# Patient Record
Sex: FEMALE | Race: WHITE | Employment: PART TIME | URBAN - METROPOLITAN AREA
[De-identification: names, ages, dates, MRNs, and addresses within clinical notes are randomized per-mention and may not be internally consistent; named-entity substitution may affect disease eponyms.]

---

## 2018-10-04 ENCOUNTER — TELEPHONE (OUTPATIENT)
Dept: FAMILY MEDICINE CLINIC | Facility: CLINIC | Age: 43
End: 2018-10-04

## 2018-10-04 NOTE — TELEPHONE ENCOUNTER
Called patient, she states she is going to school soon and needs immunizations, but is unsure if needs boosters. Patient states she does not have immunization record either, due to immigration.    I recommended patient to schedule apt with Dr. Farrah Hall or

## 2018-10-05 ENCOUNTER — APPOINTMENT (OUTPATIENT)
Dept: LAB | Facility: REFERENCE LAB | Age: 43
End: 2018-10-05
Attending: FAMILY MEDICINE
Payer: COMMERCIAL

## 2018-10-05 ENCOUNTER — OFFICE VISIT (OUTPATIENT)
Dept: FAMILY MEDICINE CLINIC | Facility: CLINIC | Age: 43
End: 2018-10-05
Payer: COMMERCIAL

## 2018-10-05 VITALS
HEART RATE: 96 BPM | RESPIRATION RATE: 17 BRPM | HEIGHT: 64 IN | OXYGEN SATURATION: 98 % | WEIGHT: 178 LBS | SYSTOLIC BLOOD PRESSURE: 110 MMHG | BODY MASS INDEX: 30.39 KG/M2 | TEMPERATURE: 98 F | DIASTOLIC BLOOD PRESSURE: 70 MMHG

## 2018-10-05 DIAGNOSIS — Z12.31 BREAST CANCER SCREENING BY MAMMOGRAM: ICD-10-CM

## 2018-10-05 DIAGNOSIS — Z23 ENCOUNTER FOR IMMUNIZATION: ICD-10-CM

## 2018-10-05 DIAGNOSIS — Z00.00 ROUTINE GENERAL MEDICAL EXAMINATION AT A HEALTH CARE FACILITY: Primary | ICD-10-CM

## 2018-10-05 DIAGNOSIS — Z02.89 ENCOUNTER FOR OCCUPATIONAL HISTORY AND PHYSICAL EXAMINATION: ICD-10-CM

## 2018-10-05 PROCEDURE — 90715 TDAP VACCINE 7 YRS/> IM: CPT | Performed by: FAMILY MEDICINE

## 2018-10-05 PROCEDURE — 90686 IIV4 VACC NO PRSV 0.5 ML IM: CPT | Performed by: FAMILY MEDICINE

## 2018-10-05 PROCEDURE — 86706 HEP B SURFACE ANTIBODY: CPT | Performed by: FAMILY MEDICINE

## 2018-10-05 PROCEDURE — 86658 ENTEROVIRUS ANTIBODY: CPT | Performed by: FAMILY MEDICINE

## 2018-10-05 PROCEDURE — 36415 COLL VENOUS BLD VENIPUNCTURE: CPT | Performed by: FAMILY MEDICINE

## 2018-10-05 PROCEDURE — 90632 HEPA VACCINE ADULT IM: CPT | Performed by: FAMILY MEDICINE

## 2018-10-05 PROCEDURE — 86762 RUBELLA ANTIBODY: CPT | Performed by: FAMILY MEDICINE

## 2018-10-05 PROCEDURE — 86787 VARICELLA-ZOSTER ANTIBODY: CPT | Performed by: FAMILY MEDICINE

## 2018-10-05 PROCEDURE — 86735 MUMPS ANTIBODY: CPT | Performed by: FAMILY MEDICINE

## 2018-10-05 PROCEDURE — 86480 TB TEST CELL IMMUN MEASURE: CPT | Performed by: FAMILY MEDICINE

## 2018-10-05 PROCEDURE — 86765 RUBEOLA ANTIBODY: CPT | Performed by: FAMILY MEDICINE

## 2018-10-05 PROCEDURE — 90472 IMMUNIZATION ADMIN EACH ADD: CPT | Performed by: FAMILY MEDICINE

## 2018-10-05 PROCEDURE — 99203 OFFICE O/P NEW LOW 30 MIN: CPT | Performed by: FAMILY MEDICINE

## 2018-10-05 PROCEDURE — 90471 IMMUNIZATION ADMIN: CPT | Performed by: FAMILY MEDICINE

## 2018-10-05 NOTE — PATIENT INSTRUCTIONS
PLAN:  -bloodwork-titers    -mammogram ordered    -Hep A, TdaP, Influenza vaccinations    -f/u for pap smear at earliest convenience-fasting bloodwork at a later date

## 2018-10-05 NOTE — PROGRESS NOTES
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 VSS. Denies fevers/chills, C.P., palpitations, dizziness, SOB, cough, abd pain, N/V/D, fatigue. No recent illness. Nml urine without dysuria or hematuria. hepatosplenomegaly. No flank pain. No rebound/rigidity. Musc: Normal muscular development. Normal gait. Extremities: Extremities normal, atraumatic, no cyanosis or edema. DP's 2+ B/L  Neuro: Normal strength, sensation and reflexes throughout.   Skin: Skin

## 2018-10-18 ENCOUNTER — NURSE ONLY (OUTPATIENT)
Dept: FAMILY MEDICINE CLINIC | Facility: CLINIC | Age: 43
End: 2018-10-18
Payer: COMMERCIAL

## 2018-10-18 NOTE — PROGRESS NOTES
Pt came in for hep B & MMR injection. Injection was administrated on right  arm IM and left arm sub. Pt tolerated well.

## 2018-12-03 ENCOUNTER — LAB ENCOUNTER (OUTPATIENT)
Dept: LAB | Facility: REFERENCE LAB | Age: 43
End: 2018-12-03
Attending: FAMILY MEDICINE
Payer: COMMERCIAL

## 2018-12-03 ENCOUNTER — TELEPHONE (OUTPATIENT)
Dept: FAMILY MEDICINE CLINIC | Facility: CLINIC | Age: 43
End: 2018-12-03

## 2018-12-03 DIAGNOSIS — Z23 NEED FOR VACCINATION: ICD-10-CM

## 2018-12-03 DIAGNOSIS — Z00.00 ROUTINE GENERAL MEDICAL EXAMINATION AT A HEALTH CARE FACILITY: ICD-10-CM

## 2018-12-03 PROCEDURE — 36415 COLL VENOUS BLD VENIPUNCTURE: CPT | Performed by: FAMILY MEDICINE

## 2018-12-03 PROCEDURE — 80050 GENERAL HEALTH PANEL: CPT | Performed by: FAMILY MEDICINE

## 2018-12-03 PROCEDURE — 80061 LIPID PANEL: CPT | Performed by: FAMILY MEDICINE

## 2018-12-03 PROCEDURE — 86735 MUMPS ANTIBODY: CPT | Performed by: FAMILY MEDICINE

## 2018-12-03 PROCEDURE — 86706 HEP B SURFACE ANTIBODY: CPT | Performed by: FAMILY MEDICINE

## 2018-12-03 NOTE — TELEPHONE ENCOUNTER
[phone]    Pt is done with testing. 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

## 2018-12-05 ENCOUNTER — TELEPHONE (OUTPATIENT)
Dept: FAMILY MEDICINE CLINIC | Facility: CLINIC | Age: 43
End: 2018-12-05

## 2018-12-05 NOTE — TELEPHONE ENCOUNTER
Results are in pt's EMR; all results printed for pt. Form placed on provider's desk for completion and signature. Call pt when complete.

## 2018-12-05 NOTE — TELEPHONE ENCOUNTER
See result recs--    Additionally, lipids show elev LDL chol, low HDL chol-rec HHD/DASH diet, regular exercise: goal 5x/week of moderate-intensity exercise. Recheck lipids in 6 months.

## 2018-12-05 NOTE — TELEPHONE ENCOUNTER
----- Message from Johana Ashby DO sent at 12/5/2018  2:24 PM CST -----  Nml CMP/TSH. Immune to Mumps. CBC: microcytosis noted.  No anemia however rec f/u iron studies to ensure no vitamin deficiency (pending potential add-on to existing labs--)

## 2018-12-05 NOTE — TELEPHONE ENCOUNTER
Ok-fasting lipids are ideal-encourage concentration on HHD/DASH diet and recheck lipids earlier (fasting) in 3 months    FYI-lab unable to add on iron studies-rec she return for these labs

## 2018-12-05 NOTE — TELEPHONE ENCOUNTER
Spoke with pt, informed her of lab results and recommendations per Dr. Ransom Sacks. Pt informed additional labs have been added; will inform her of results when returns.  Pt informed she is not immune to Hep B; pt scheduled appt for Hep B #2 on 12/6/18 and #3

## 2018-12-05 NOTE — TELEPHONE ENCOUNTER
Informed pt of Lipid panel results and recommendations per Dr. Mehrdad Shrestha. Pt states she is currently exercising at this level; pt was not fasting during test (coffee with cream and sugar).

## 2018-12-06 ENCOUNTER — APPOINTMENT (OUTPATIENT)
Dept: LAB | Facility: REFERENCE LAB | Age: 43
End: 2018-12-06
Attending: FAMILY MEDICINE
Payer: COMMERCIAL

## 2018-12-06 ENCOUNTER — NURSE ONLY (OUTPATIENT)
Dept: FAMILY MEDICINE CLINIC | Facility: CLINIC | Age: 43
End: 2018-12-06
Payer: COMMERCIAL

## 2018-12-06 PROCEDURE — 36415 COLL VENOUS BLD VENIPUNCTURE: CPT | Performed by: FAMILY MEDICINE

## 2018-12-06 PROCEDURE — 83540 ASSAY OF IRON: CPT | Performed by: FAMILY MEDICINE

## 2018-12-06 PROCEDURE — 82728 ASSAY OF FERRITIN: CPT | Performed by: FAMILY MEDICINE

## 2018-12-06 PROCEDURE — 84466 ASSAY OF TRANSFERRIN: CPT | Performed by: FAMILY MEDICINE

## 2018-12-06 PROCEDURE — 90746 HEPB VACCINE 3 DOSE ADULT IM: CPT | Performed by: FAMILY MEDICINE

## 2018-12-06 PROCEDURE — 90471 IMMUNIZATION ADMIN: CPT | Performed by: FAMILY MEDICINE

## 2018-12-06 NOTE — PROGRESS NOTES
Patient comes to office for need of Hep B #2. Vaccine consent signed. Hep B #2 inj'd to patient's left deltoid. Patient tolerated well with no complications. School form given to patient.

## 2018-12-07 PROBLEM — R71.8 MICROCYTOSIS: Status: ACTIVE | Noted: 2018-12-07

## 2019-02-12 ENCOUNTER — NURSE ONLY (OUTPATIENT)
Dept: FAMILY MEDICINE CLINIC | Facility: CLINIC | Age: 44
End: 2019-02-12
Payer: COMMERCIAL

## 2019-02-12 DIAGNOSIS — Z23 ENCOUNTER FOR IMMUNIZATION: Primary | ICD-10-CM

## 2019-02-12 PROCEDURE — G0010 ADMIN HEPATITIS B VACCINE: HCPCS | Performed by: FAMILY MEDICINE

## 2019-02-12 PROCEDURE — 90746 HEPB VACCINE 3 DOSE ADULT IM: CPT | Performed by: FAMILY MEDICINE

## 2019-05-02 ENCOUNTER — APPOINTMENT (OUTPATIENT)
Dept: LAB | Facility: REFERENCE LAB | Age: 44
End: 2019-05-02
Attending: FAMILY MEDICINE
Payer: COMMERCIAL

## 2019-05-02 ENCOUNTER — TELEPHONE (OUTPATIENT)
Dept: FAMILY MEDICINE CLINIC | Facility: CLINIC | Age: 44
End: 2019-05-02

## 2019-05-02 DIAGNOSIS — Z01.84 IMMUNITY STATUS TESTING: Primary | ICD-10-CM

## 2019-05-02 DIAGNOSIS — Z01.84 IMMUNITY STATUS TESTING: ICD-10-CM

## 2019-05-02 DIAGNOSIS — Z23 NEED FOR RUBELLA VACCINATION: ICD-10-CM

## 2019-05-02 DIAGNOSIS — Z23 NEED FOR MUMPS VACCINATION: ICD-10-CM

## 2019-05-02 PROCEDURE — 86706 HEP B SURFACE ANTIBODY: CPT | Performed by: FAMILY MEDICINE

## 2019-05-02 PROCEDURE — 86765 RUBEOLA ANTIBODY: CPT | Performed by: FAMILY MEDICINE

## 2019-05-02 PROCEDURE — 36415 COLL VENOUS BLD VENIPUNCTURE: CPT | Performed by: FAMILY MEDICINE

## 2019-05-02 PROCEDURE — 86762 RUBELLA ANTIBODY: CPT | Performed by: FAMILY MEDICINE

## 2019-05-02 PROCEDURE — 86735 MUMPS ANTIBODY: CPT | Performed by: FAMILY MEDICINE

## 2019-05-02 NOTE — TELEPHONE ENCOUNTER
Patient walked in to office requesting recheck of Hep B and MMR titers, no orders in chart. Titers ordered - cosign required.     See titers 10/5/2018  Notes recorded by Janae Mehta DO on 10/15/2018 at 12:43 PM CDT  Immune to Polio, Varicella, Measl

## 2019-05-07 NOTE — TELEPHONE ENCOUNTER
----- Message from Yasemin Snow MD sent at 5/5/2019  1:40 PM CDT -----  [facility],    Below are the results of your recently performed labs/tests: All results are within NORMAL limits EXCEPT:    You are again NOT immune to mumps.

## 2019-05-07 NOTE — TELEPHONE ENCOUNTER
Patient saw Dr. Sanjuana Corea for school physical 10/5/18. What do you recommend pt do from here?     Dr. Chrissy Chaudhry thinks additional booster of MMR (Sweet requested I send this to you - so you know what she is thinking)

## 2019-05-08 NOTE — TELEPHONE ENCOUNTER
Called patient, advised immune to Hep B, Measles and Rubella, not immune to mumps - recommended MMR inj per Dr. Rm Pearson & Pedro. Pt scheduled for next Wednesday.

## 2019-05-15 ENCOUNTER — NURSE ONLY (OUTPATIENT)
Dept: FAMILY MEDICINE CLINIC | Facility: CLINIC | Age: 44
End: 2019-05-15
Payer: COMMERCIAL

## 2019-05-15 PROCEDURE — 90707 MMR VACCINE SC: CPT | Performed by: FAMILY MEDICINE

## 2019-05-15 PROCEDURE — 90471 IMMUNIZATION ADMIN: CPT | Performed by: FAMILY MEDICINE

## 2019-05-30 ENCOUNTER — TELEPHONE (OUTPATIENT)
Dept: FAMILY MEDICINE CLINIC | Facility: CLINIC | Age: 44
End: 2019-05-30

## 2019-05-30 NOTE — TELEPHONE ENCOUNTER
Called patient, she would like to know why she was billed for Rubeola (measles). Advised to patient she had gotten the MMR vaccine which contains rubeola, mumps and rubella.  Patient was immune to measles and rubella, not mumps - patient came in for nurse rosey

## 2020-09-30 ENCOUNTER — TELEPHONE (OUTPATIENT)
Dept: FAMILY MEDICINE CLINIC | Facility: CLINIC | Age: 45
End: 2020-09-30

## 2021-09-23 ENCOUNTER — LAB ENCOUNTER (OUTPATIENT)
Dept: LAB | Facility: HOSPITAL | Age: 46
End: 2021-09-23
Attending: FAMILY MEDICINE
Payer: COMMERCIAL

## 2021-09-23 ENCOUNTER — HOSPITAL ENCOUNTER (OUTPATIENT)
Dept: GENERAL RADIOLOGY | Facility: HOSPITAL | Age: 46
Discharge: HOME | End: 2021-09-23
Attending: FAMILY MEDICINE
Payer: COMMERCIAL

## 2021-09-23 DIAGNOSIS — Z01.818 PREOP EXAMINATION: Primary | ICD-10-CM

## 2021-09-23 DIAGNOSIS — Z01.818 PRE-OP TESTING: ICD-10-CM

## 2021-09-23 DIAGNOSIS — Z01.812 PRE-OPERATIVE LABORATORY EXAMINATION: ICD-10-CM

## 2021-09-23 DIAGNOSIS — M62.08 DIASTASIS RECTI: ICD-10-CM

## 2021-09-23 PROCEDURE — 93010 ELECTROCARDIOGRAM REPORT: CPT | Performed by: FAMILY MEDICINE

## 2021-09-23 PROCEDURE — 93005 ELECTROCARDIOGRAM TRACING: CPT

## 2021-09-23 PROCEDURE — 71046 X-RAY EXAM CHEST 2 VIEWS: CPT | Performed by: FAMILY MEDICINE

## 2023-05-26 ENCOUNTER — TELEPHONE (OUTPATIENT)
Dept: FAMILY MEDICINE CLINIC | Facility: CLINIC | Age: 48
End: 2023-05-26

## 2023-05-30 ENCOUNTER — PATIENT OUTREACH (OUTPATIENT)
Dept: CASE MANAGEMENT | Age: 48
End: 2023-05-30

## 2023-05-30 NOTE — PROCEDURES
The office order for PCP removal request is Approved and finalized on May 30, 2023.     Thanks,  [facility] [employer]

## (undated) NOTE — LETTER
12/05/18          Sam Stack  [address] [address]            To whom it may concern,    This letter is being written to inform you, Sam Stack, has been given two injections in the Hepatitis B series.      She is scheduled to have her